# Patient Record
Sex: FEMALE | Race: WHITE | NOT HISPANIC OR LATINO | URBAN - METROPOLITAN AREA
[De-identification: names, ages, dates, MRNs, and addresses within clinical notes are randomized per-mention and may not be internally consistent; named-entity substitution may affect disease eponyms.]

---

## 2018-11-13 ENCOUNTER — ON CAMPUS - OUTPATIENT (AMBULATORY)
Dept: URBAN - METROPOLITAN AREA HOSPITAL 2 | Facility: HOSPITAL | Age: 53
End: 2018-11-13
Payer: COMMERCIAL

## 2018-11-13 ENCOUNTER — OFFICE (AMBULATORY)
Dept: URBAN - METROPOLITAN AREA PATHOLOGY 4 | Facility: PATHOLOGY | Age: 53
End: 2018-11-13
Payer: COMMERCIAL

## 2018-11-13 VITALS
SYSTOLIC BLOOD PRESSURE: 126 MMHG | HEART RATE: 74 BPM | OXYGEN SATURATION: 98 % | TEMPERATURE: 97.9 F | HEART RATE: 75 BPM | DIASTOLIC BLOOD PRESSURE: 55 MMHG | HEART RATE: 72 BPM | WEIGHT: 177 LBS | RESPIRATION RATE: 19 BRPM | RESPIRATION RATE: 13 BRPM | SYSTOLIC BLOOD PRESSURE: 153 MMHG | DIASTOLIC BLOOD PRESSURE: 62 MMHG | HEIGHT: 62 IN | SYSTOLIC BLOOD PRESSURE: 111 MMHG | SYSTOLIC BLOOD PRESSURE: 125 MMHG | DIASTOLIC BLOOD PRESSURE: 80 MMHG | HEART RATE: 73 BPM | OXYGEN SATURATION: 100 % | SYSTOLIC BLOOD PRESSURE: 103 MMHG | DIASTOLIC BLOOD PRESSURE: 79 MMHG | DIASTOLIC BLOOD PRESSURE: 69 MMHG | OXYGEN SATURATION: 97 % | OXYGEN SATURATION: 95 % | SYSTOLIC BLOOD PRESSURE: 157 MMHG | SYSTOLIC BLOOD PRESSURE: 102 MMHG | RESPIRATION RATE: 16 BRPM | SYSTOLIC BLOOD PRESSURE: 116 MMHG | OXYGEN SATURATION: 96 % | HEART RATE: 70 BPM | DIASTOLIC BLOOD PRESSURE: 73 MMHG | DIASTOLIC BLOOD PRESSURE: 68 MMHG | DIASTOLIC BLOOD PRESSURE: 136 MMHG | OXYGEN SATURATION: 99 % | RESPIRATION RATE: 18 BRPM

## 2018-11-13 DIAGNOSIS — D12.5 BENIGN NEOPLASM OF SIGMOID COLON: ICD-10-CM

## 2018-11-13 DIAGNOSIS — K57.30 DIVERTICULOSIS OF LARGE INTESTINE WITHOUT PERFORATION OR ABS: ICD-10-CM

## 2018-11-13 DIAGNOSIS — Z12.11 ENCOUNTER FOR SCREENING FOR MALIGNANT NEOPLASM OF COLON: ICD-10-CM

## 2018-11-13 DIAGNOSIS — D12.2 BENIGN NEOPLASM OF ASCENDING COLON: ICD-10-CM

## 2018-11-13 LAB
GI HISTOLOGY: A. UNSPECIFIED: (no result)
GI HISTOLOGY: B. UNSPECIFIED: (no result)
GI HISTOLOGY: PDF REPORT: (no result)

## 2018-11-13 PROCEDURE — 88305 TISSUE EXAM BY PATHOLOGIST: CPT | Mod: 33 | Performed by: INTERNAL MEDICINE

## 2018-11-13 PROCEDURE — 45380 COLONOSCOPY AND BIOPSY: CPT | Mod: 33,59 | Performed by: INTERNAL MEDICINE

## 2018-11-13 PROCEDURE — 45385 COLONOSCOPY W/LESION REMOVAL: CPT | Mod: 33 | Performed by: INTERNAL MEDICINE

## 2019-12-06 ENCOUNTER — OFFICE VISIT (OUTPATIENT)
Dept: CARDIOLOGY | Facility: CLINIC | Age: 54
End: 2019-12-06

## 2019-12-06 VITALS
HEART RATE: 63 BPM | HEIGHT: 64 IN | BODY MASS INDEX: 32.91 KG/M2 | DIASTOLIC BLOOD PRESSURE: 92 MMHG | WEIGHT: 192.8 LBS | OXYGEN SATURATION: 95 % | SYSTOLIC BLOOD PRESSURE: 145 MMHG

## 2019-12-06 DIAGNOSIS — E78.5 HYPERLIPIDEMIA, UNSPECIFIED HYPERLIPIDEMIA TYPE: ICD-10-CM

## 2019-12-06 DIAGNOSIS — I25.10 CORONARY ARTERY DISEASE INVOLVING NATIVE CORONARY ARTERY OF NATIVE HEART WITHOUT ANGINA PECTORIS: Primary | ICD-10-CM

## 2019-12-06 PROCEDURE — 99213 OFFICE O/P EST LOW 20 MIN: CPT | Performed by: INTERNAL MEDICINE

## 2019-12-06 PROCEDURE — 93000 ELECTROCARDIOGRAM COMPLETE: CPT | Performed by: INTERNAL MEDICINE

## 2019-12-06 RX ORDER — FLUCONAZOLE 150 MG/1
TABLET ORAL
Refills: 0 | COMMUNITY
Start: 2019-11-26

## 2019-12-06 RX ORDER — ROSUVASTATIN CALCIUM 5 MG/1
TABLET, COATED ORAL DAILY
Refills: 0 | COMMUNITY
Start: 2019-10-07 | End: 2019-12-06 | Stop reason: SDUPTHER

## 2019-12-06 RX ORDER — ROSUVASTATIN CALCIUM 5 MG/1
5 TABLET, COATED ORAL DAILY
Qty: 90 TABLET | Refills: 4 | Status: SHIPPED | OUTPATIENT
Start: 2019-12-06

## 2019-12-06 RX ORDER — CLOPIDOGREL BISULFATE 75 MG/1
TABLET ORAL DAILY
Refills: 0 | COMMUNITY
Start: 2019-10-07 | End: 2019-12-06 | Stop reason: SDUPTHER

## 2019-12-06 RX ORDER — NITROGLYCERIN 0.4 MG/1
0.4 TABLET SUBLINGUAL
Qty: 25 TABLET | Refills: 4 | Status: SHIPPED | OUTPATIENT
Start: 2019-12-06

## 2019-12-06 RX ORDER — CLOPIDOGREL BISULFATE 75 MG/1
75 TABLET ORAL DAILY
Qty: 90 TABLET | Refills: 4 | Status: SHIPPED | OUTPATIENT
Start: 2019-12-06

## 2019-12-06 RX ORDER — MELATONIN
1000 DAILY
COMMUNITY

## 2019-12-06 RX ORDER — LORATADINE 10 MG/1
TABLET ORAL DAILY
COMMUNITY
Start: 2012-12-07

## 2019-12-06 RX ORDER — NITROGLYCERIN 0.4 MG/1
TABLET SUBLINGUAL
COMMUNITY
Start: 2012-12-07 | End: 2019-12-06 | Stop reason: SDUPTHER

## 2019-12-06 NOTE — PROGRESS NOTES
History of Present Illness:  Followup 1 year           CC: Followup for CAD, Dyslipidemia, status post previous PCI stenting of LAD in 2008      Ms. 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  She has history of dyslipidemia and is currently taking Crestor.     She was seen  today for her yearly followup.  She stopped smoking cigarettes 2 years ago and is doing very well.    Her blood pressure today was slightly elevated and she will keep a watch on it 145/92 which may be whitecoat hypertension.    Her last lipids in 2016 showed total cholesterol of 127 triglycerides 115 HDL 63 LDL of 41.  Her current therapy will be continued and we will see her in the office in 1 year for follow-up or sooner if necessary.  We will repeat her lipid profile at that time.      Indication for EKG: Coronary artery disease previous PCI stenting of LAD in March 2008    Normal sinus rhythm with a rate of 63 bpm short ME interval remote possibility of old inferior myocardial infarction similar to previous EKG of 12/7/2018.  ME interval 118 ms QRS duration 93 ms  ms and QRS axis -2.      She offers no complaints chest pain dyspnea palpitations new  She has been involved in running mini marathons a in the past but she has had some injury to her right ankle and I have suggested that she just keeps walking.    Assessment/plan    1.  Coronary artery disease status post PCI stenting of LAD 2008    2.  dyslipidemia as noted above.      Thank you very much for allowing us to participate in the care of your patients                Problems: Active problems were reviewed with the patient during this visit.  Medications: Medications were reviewed with the patient during this visit.  Allergies: Allergies were reviewed with the patient during this visit.  No Known Allergy.                Past Medical History:     Reviewed history from 12/22/2017  and no changes required:        Coronary Heart Disease: S/P PCI         Hyperlipidemia     Past Surgical History:     Reviewed history from 2017 and no changes required:                Tonsillectomy        P T C A: 3-7-08, LAD, Vision stent        D&C  Endometrial Ablation         Heart Catherization:      Active Medications (reviewed today):  ROSUVASTATIN CALCIUM 5 MG ORAL TABLET (ROSUVASTATIN CALCIUM) Take 1 tablet by mouth daily  CHANTIX 0.5 MG ORAL TABLET (VARENICLINE TARTRATE) Take 1 tablet by mouth daily Days 1-3 Take one (1) tablet by mouth twice a day Days 4-7  NITROSTAT 0.4 MG SUBLINGUAL TABLET SUBLINGUAL (NITROGLYCERIN) 1 sublingual as needed and as directed  WOMENS MULTIVITAMIN PLUS ORAL TABLET (MULTIPLE VITAMINS-MINERALS) Take one (1) tablet by mouth daily.  CRESTOR 5 MG ORAL TABLET (ROSUVASTATIN CALCIUM) Take 1 tablet by mouth daily  ASPIRIN 81 MG ORAL TABLET (ASPIRIN) Take one by mouth daily  CLARITIN 10 MG ORAL TABLET (LORATADINE) Take one by mouth daily PRN  NITROSTAT 0.4 MG SUBLINGUAL TABLET SUBLINGUAL (NITROGLYCERIN) Dissolve one (1) tablet under the tongue every five minutes as needed for chest pain. Do Not Exceed 3 tablets.  CLOPIDOGREL BISULFATE 75 MG ORAL TABLET (CLOPIDOGREL BISULFATE) Take 1 tablet by mouth daily     Current Allergies (reviewed today):  No known allergies     Family History Summary:      Reviewed history Last on 2016 and no changes required:2018        General Comments - FH:  Heart disease:  mother MI mid 60's        Social History:     Reviewed history from 2017 and no changes required:                3 children         Full-time worker           Risk Factors:      Smoked Tobacco Use:  Current every day smoker     Cigarettes:  Yes -- 1 pack(s) per day,Smokeless Tobacco Use:  Never     Counseled to quit/cut down:  yes  Passive smoke exposure:  no  Drug use:  no  HIV high-risk behavior:  no  Caffeine use:  2 drinks per  day  Alcohol use:  no  Exercise:  no  Seatbelt use:  100 %  Sun Exposure:  occasionally     Family History Risk Factors:     Family History of MI in females < 65 years old:  yes     Family History of MI in males < 55 years old:  no           Review of Systems   General: Denies fever, chills, fatigue  Eyes: denies blurring, diplopia, irritation, discharge, vision loss, eye pain, photophobia  Ear/Nose/Throat: denies ear pain or discharge, tinnitus, decreased hearing, nasal obstruction or discharge, nosebleeds, sore throat, hoarseness, dysphagia  Cardiovascular:  coronary artery disease.  Status post PCI stenting of LAD March 2008.  Dyslipidemia  Respiratory: Denies cough, shortness of breath, chest discomfort, wheezing  Gastrointestinal: Denies nausea, vomiting, abdominal pain, dark tarry stools, bloody stools  Musculoskeletal: denies back pain, joint pain, joint swelling, muscle cramps, muscle weakness, stiffness, arthritis  Neurologic: denies transient paralysis, weakness, paresthesias, seizures, syncope, tremors, vertigo  Hematologic/Lymphatic: denies abnormal bruising, bleeding, enlarged lymph nodes        Physical Exam     General:      well developed, well nourished, in no acute distress.    Neck:      no masses, thyromegaly, or abnormal cervical nodes.   no JVD. No carotid bruits  Lungs:      clear bilaterally to auscultation.    Heart:      non-displaced PMI, chest non-tender; regular rate and rhythm, S1, S2 without murmurs, rubs, or gallops  Pulses:      pulses normal in all 4 extremities.    Extremities:       no edema

## 2021-01-04 PROBLEM — I25.10 CORONARY ARTERY DISEASE INVOLVING NATIVE CORONARY ARTERY OF NATIVE HEART WITHOUT ANGINA PECTORIS: Status: ACTIVE | Noted: 2021-01-04

## 2021-01-04 PROBLEM — R03.0 SITUATIONAL HYPERTENSION: Status: ACTIVE | Noted: 2021-01-04

## 2021-01-04 PROBLEM — E78.2 MIXED HYPERLIPIDEMIA: Status: ACTIVE | Noted: 2021-01-04

## 2021-01-04 PROBLEM — Z72.0 TOBACCO ABUSE: Status: ACTIVE | Noted: 2021-01-04

## 2021-11-24 ENCOUNTER — ON CAMPUS - OUTPATIENT (AMBULATORY)
Dept: URBAN - METROPOLITAN AREA HOSPITAL 2 | Facility: HOSPITAL | Age: 56
End: 2021-11-24
Payer: COMMERCIAL

## 2021-11-24 ENCOUNTER — OFFICE (AMBULATORY)
Dept: URBAN - METROPOLITAN AREA PATHOLOGY 4 | Facility: PATHOLOGY | Age: 56
End: 2021-11-24
Payer: COMMERCIAL

## 2021-11-24 VITALS
OXYGEN SATURATION: 83 % | HEART RATE: 102 BPM | SYSTOLIC BLOOD PRESSURE: 131 MMHG | DIASTOLIC BLOOD PRESSURE: 105 MMHG | OXYGEN SATURATION: 96 % | HEART RATE: 75 BPM | DIASTOLIC BLOOD PRESSURE: 75 MMHG | SYSTOLIC BLOOD PRESSURE: 94 MMHG | HEART RATE: 76 BPM | RESPIRATION RATE: 16 BRPM | HEART RATE: 116 BPM | OXYGEN SATURATION: 92 % | DIASTOLIC BLOOD PRESSURE: 65 MMHG | WEIGHT: 200 LBS | RESPIRATION RATE: 18 BRPM | TEMPERATURE: 95.7 F | HEART RATE: 90 BPM | HEART RATE: 119 BPM | HEIGHT: 62 IN | DIASTOLIC BLOOD PRESSURE: 57 MMHG | RESPIRATION RATE: 19 BRPM | SYSTOLIC BLOOD PRESSURE: 141 MMHG | DIASTOLIC BLOOD PRESSURE: 50 MMHG | OXYGEN SATURATION: 97 % | HEART RATE: 78 BPM | DIASTOLIC BLOOD PRESSURE: 77 MMHG | SYSTOLIC BLOOD PRESSURE: 120 MMHG | DIASTOLIC BLOOD PRESSURE: 114 MMHG | OXYGEN SATURATION: 98 % | OXYGEN SATURATION: 90 % | SYSTOLIC BLOOD PRESSURE: 151 MMHG | SYSTOLIC BLOOD PRESSURE: 125 MMHG | DIASTOLIC BLOOD PRESSURE: 81 MMHG | SYSTOLIC BLOOD PRESSURE: 112 MMHG | SYSTOLIC BLOOD PRESSURE: 126 MMHG | HEART RATE: 85 BPM

## 2021-11-24 DIAGNOSIS — Z86.010 PERSONAL HISTORY OF COLONIC POLYPS: ICD-10-CM

## 2021-11-24 DIAGNOSIS — K62.1 RECTAL POLYP: ICD-10-CM

## 2021-11-24 LAB
GI HISTOLOGY: A. UNSPECIFIED: (no result)
GI HISTOLOGY: PDF REPORT: (no result)

## 2021-11-24 PROCEDURE — 88305 TISSUE EXAM BY PATHOLOGIST: CPT | Mod: 33 | Performed by: INTERNAL MEDICINE

## 2021-11-24 PROCEDURE — 45385 COLONOSCOPY W/LESION REMOVAL: CPT | Mod: 33 | Performed by: INTERNAL MEDICINE
